# Patient Record
Sex: MALE | Race: WHITE | NOT HISPANIC OR LATINO | Employment: FULL TIME | URBAN - METROPOLITAN AREA
[De-identification: names, ages, dates, MRNs, and addresses within clinical notes are randomized per-mention and may not be internally consistent; named-entity substitution may affect disease eponyms.]

---

## 2018-02-02 ENCOUNTER — NON-PROVIDER VISIT (OUTPATIENT)
Dept: URGENT CARE | Facility: PHYSICIAN GROUP | Age: 23
End: 2018-02-02

## 2018-02-02 DIAGNOSIS — Z02.1 PRE-EMPLOYMENT DRUG SCREENING: ICD-10-CM

## 2018-02-02 LAB
AMP AMPHETAMINE: NEGATIVE
COC COCAINE: NEGATIVE
INT CON NEG: NEGATIVE
INT CON POS: POSITIVE
MET METHAMPHETAMINES: NEGATIVE
OPI OPIATES: NEGATIVE
PCP PHENCYCLIDINE: NEGATIVE
POC DRUG COMMENT 753798-OCCUPATIONAL HEALTH: NORMAL
THC: NEGATIVE

## 2018-02-02 PROCEDURE — 80305 DRUG TEST PRSMV DIR OPT OBS: CPT | Performed by: NURSE PRACTITIONER

## 2018-08-08 ENCOUNTER — NON-PROVIDER VISIT (OUTPATIENT)
Dept: OCCUPATIONAL MEDICINE | Facility: CLINIC | Age: 23
End: 2018-08-08

## 2018-08-08 DIAGNOSIS — Z02.1 PRE-EMPLOYMENT DRUG SCREENING: ICD-10-CM

## 2018-08-08 PROCEDURE — 80305 DRUG TEST PRSMV DIR OPT OBS: CPT | Performed by: INTERNAL MEDICINE

## 2018-08-14 LAB
AMP AMPHETAMINE: NORMAL
COC COCAINE: NORMAL
INT CON NEG: NORMAL
INT CON POS: NORMAL
MET METHAMPHETAMINES: NORMAL
OPI OPIATES: NORMAL
PCP PHENCYCLIDINE: NORMAL
POC DRUG COMMENT 753798-OCCUPATIONAL HEALTH: NEGATIVE
THC: NORMAL

## 2019-06-13 ENCOUNTER — OFFICE VISIT (OUTPATIENT)
Dept: URGENT CARE | Facility: PHYSICIAN GROUP | Age: 24
End: 2019-06-13

## 2019-06-13 DIAGNOSIS — Z02.1 PRE-EMPLOYMENT DRUG SCREENING: ICD-10-CM

## 2019-06-13 LAB
AMP AMPHETAMINE: NEGATIVE
COC COCAINE: NEGATIVE
INT CON NEG: NORMAL
INT CON POS: NORMAL
MET METHAMPHETAMINES: NEGATIVE
OPI OPIATES: NEGATIVE
PCP PHENCYCLIDINE: NEGATIVE
POC DRUG COMMENT 753798-OCCUPATIONAL HEALTH: NEGATIVE
THC: NEGATIVE

## 2022-05-08 ENCOUNTER — APPOINTMENT (OUTPATIENT)
Dept: RADIOLOGY | Facility: MEDICAL CENTER | Age: 27
End: 2022-05-08
Attending: EMERGENCY MEDICINE
Payer: OTHER MISCELLANEOUS

## 2022-05-08 ENCOUNTER — HOSPITAL ENCOUNTER (EMERGENCY)
Facility: MEDICAL CENTER | Age: 27
End: 2022-05-08
Attending: EMERGENCY MEDICINE
Payer: OTHER MISCELLANEOUS

## 2022-05-08 VITALS
WEIGHT: 155 LBS | TEMPERATURE: 98.6 F | OXYGEN SATURATION: 94 % | HEART RATE: 91 BPM | SYSTOLIC BLOOD PRESSURE: 133 MMHG | DIASTOLIC BLOOD PRESSURE: 61 MMHG | BODY MASS INDEX: 22.19 KG/M2 | RESPIRATION RATE: 17 BRPM | HEIGHT: 70 IN

## 2022-05-08 DIAGNOSIS — S93.05XA DISLOCATION OF LEFT ANKLE JOINT, INITIAL ENCOUNTER: ICD-10-CM

## 2022-05-08 DIAGNOSIS — S82.832A CLOSED FRACTURE OF DISTAL END OF LEFT FIBULA, UNSPECIFIED FRACTURE MORPHOLOGY, INITIAL ENCOUNTER: ICD-10-CM

## 2022-05-08 PROCEDURE — 307742 HCHG YELLOW TRAUMA TEAM SERVICES

## 2022-05-08 PROCEDURE — 302875 HCHG BANDAGE ACE 4 OR 6""

## 2022-05-08 PROCEDURE — 99285 EMERGENCY DEPT VISIT HI MDM: CPT

## 2022-05-08 PROCEDURE — 700101 HCHG RX REV CODE 250: Performed by: EMERGENCY MEDICINE

## 2022-05-08 PROCEDURE — 73590 X-RAY EXAM OF LOWER LEG: CPT | Mod: LT

## 2022-05-08 PROCEDURE — 96375 TX/PRO/DX INJ NEW DRUG ADDON: CPT

## 2022-05-08 PROCEDURE — 96374 THER/PROPH/DIAG INJ IV PUSH: CPT

## 2022-05-08 PROCEDURE — 700111 HCHG RX REV CODE 636 W/ 250 OVERRIDE (IP): Performed by: EMERGENCY MEDICINE

## 2022-05-08 PROCEDURE — 29515 APPLICATION SHORT LEG SPLINT: CPT

## 2022-05-08 PROCEDURE — 73600 X-RAY EXAM OF ANKLE: CPT | Mod: LT

## 2022-05-08 PROCEDURE — 27840 TREAT ANKLE DISLOCATION: CPT

## 2022-05-08 RX ORDER — KETAMINE HYDROCHLORIDE 50 MG/ML
INJECTION, SOLUTION INTRAMUSCULAR; INTRAVENOUS
Status: COMPLETED | OUTPATIENT
Start: 2022-05-08 | End: 2022-05-08

## 2022-05-08 RX ADMIN — PROPOFOL 40 MG: 10 INJECTION, EMULSION INTRAVENOUS at 00:32

## 2022-05-08 RX ADMIN — KETAMINE HYDROCHLORIDE 30 MG: 50 INJECTION INTRAMUSCULAR; INTRAVENOUS at 00:31

## 2022-05-08 NOTE — ED NOTES
LLE ankle dislocation and fibula fx on LLE xray  Dr Fox preparing to reduce.   Pt placed on EtCO2 monitor and 2L nasal cannula  RT Eddy at bedside.

## 2022-05-08 NOTE — PROGRESS NOTES
Trauma surgery    Responded the trauma yellow for reported fall from second story.  Patient is actually slip on steps with a an ankle fracture with no neurovascular compromise.  He is awake and alert.  Fractures being reduced by ERP  Plan is for splinting and discharge with orthopedic follow-up.

## 2022-05-08 NOTE — ED NOTES
Pt verbalized and demonstrated proper crutch use. Pt ambulated with crutches to restroom with steady gait and proper technique

## 2022-05-08 NOTE — ED NOTES
Pt arrived through the lobby, reportedly fell from 2nd floor railing, landing on feet, obvious deformity to LLE ankle

## 2022-05-08 NOTE — ED PROVIDER NOTES
"ED Provider Note      Means of Arrival: Private Vehicle  History obtained from: Patient    CHIEF COMPLAINT  No chief complaint on file.  Ankle pain    HPI  Meng Molina is a 27 y.o. male who presents as a trauma yellow from triage.  The patient was reportedly sliding down a banister from a second story level down a staircase to the first story level when after approximately 4 stairs he slipped off and injured his left ankle.  He denies any head strike or loss of consciousness.  He does report alcohol use tonight but denies any drug use.  He denies any chest pain, abdominal pain, back pain, paresthesias.    REVIEW OF SYSTEMS  CONSTITUTIONAL:  No fever.  CARDIOVASCULAR:  No chest discomfort.  RESPIRATORY:  No pleuritic chest pain.  GASTROINTESTINAL:  No abdominal pain.    See HPI for further details.   All other systems are negative.     PAST MEDICAL HISTORY  No past medical history on file.    FAMILY HISTORY  No family history on file.    SOCIAL HISTORY       SURGICAL HISTORY  No past surgical history on file.    CURRENT MEDICATIONS  Home Medications    **Home medications have not yet been reviewed for this encounter**         ALLERGIES  Not on File    PHYSICAL EXAM  VITAL SIGNS: /61   Pulse 91   Temp 37 °C (98.6 °F) (Temporal)   Resp 17   Ht 1.778 m (5' 10\")   Wt 70.3 kg (155 lb)   SpO2 94%   BMI 22.24 kg/m²    Gen: Alert, oriented  HENT: No racoon eyes, septal hematoma, facial instability.  Eye: EOMI, no chemosis, PERRL  Neck: trachea midline, no tenderness  Resp: no respiratory distress,  no chest wall tenderness or crepitus, clear to auscultation bilaterally  CV: No JVD, RRR.  + peripheral pulses, no murmurs, rubs, gallops  Abd: soft, non-distended, non-tender. No ecchymosis  Back: no spinal tenderness or deformities  Ext: Deformity to left ankle.  Distal CSM intact.  Otherwise, no deformities, tenderness or edema  Psych: normal mood  Neuro: speech fluent, moves all extremities. GCS " 15    RADIOLOGY/PROCEDURES  DX-ANKLE 2- VIEWS LEFT   Final Result      Near-anatomic alignment status post reduction.      DX-TIBIA AND FIBULA LEFT   Final Result      1.  Posterior tibiotalar dislocation with probable fracture of the posterior tibial malleolus.   2.  Mildly angulated obliquely oriented fracture of the distal fibular diaphysis.      US-ABORTED US PROCEDURE    (Results Pending)   Procedural sedation:  indication: Fracture dislocation left ankle  I have discussed with the patient and/or the patient representative the indication, alternatives, and the possible risks and/or complications of the planned procedure and the anesthesia methods. The patient and/or patient representative appear to understand and agree to proceed. Consent was obtained. An airway assessment was performed.     Mallampati: 2   ASA class: 1     A timeout was performed, including 2 patient identifiers, procedure, appropriate equipment and staff in the room, appropriate medications at bedside.       Patient vitals and continuous end-tidal CO2 were monitored during the sedation with RT at beside. Pt placed on NC and the airway box was present. PT received 30 mg of ketamine and 40 mg of propofol initial with good effect.       Procedure was performed without any complications. Patient tolerated sedation without any complications and was observed until the patient returned to normal alertness. Post sedation vital signes stable.     Post sedation exam: back to baseline  Total sedation time: 15 minutes  ======  Procedure fracture reduction  Indication: Ankle fracture/dislocation.  Informed consent was obtained verbally.  After adequate anesthesia with sedation, manual traction was placed with reduction of the joint.  This was splinted.  Distal CSM intact after splinting and reduction.  No immediate complications.    =============  Procedure: Physician applied splint  Indication: Fracture  After appropriate alignment of the fracture site  obtained, I applied a stirrup and posterior slab plaster splint to the left ankle. CSM intact post-splinting. There we no immediate complications.        COURSE & MEDICAL DECISION MAKING  Pertinent Labs & Imaging studies reviewed. (See chart for details)    Patient presents with a obvious deformity to the left ankle.  Remainder of trauma evaluation is reassuring.  Patient was activated as a trauma yellow based on mechanism of injury, however no evidence of other injuries.  Dr. Jacobo, trauma surgery agrees with my plan that I primarily manage the patient and referred to orthopedics.  The patient was successfully sedated, reduced in the trauma bay.  He will be ambulated on crutches and advised to follow-up with orthopedics.     Patient is able to ambulate easily on crutches. Not requiring further pain medications.    The patient was given return precautions, anticipatory guidance, and the opportunity to ask questions prior to discharge.     Appropriate PPE were worn at this encounter.     FINAL IMPRESSION  1. Dislocation of left ankle joint, initial encounter    2. Closed fracture of distal end of left fibula, unspecified fracture morphology, initial encounter           DISPOSITION:  Patient will be discharged home in stable condition.    FOLLOW UP:  Larry Verde M.D.  [address]  [id number] [phone]    Schedule an appointment as soon as possible for a visit       [facility], Emergency Dept  [address] [place] [id number] [phone]    If symptoms worsen      This dictation was created using voice recognition software. The accuracy of the dictation is limited to the abilities of the software. I expect there may be some errors of grammar and possibly content. The nursing notes were reviewed and certain aspects of this information were incorporated into this note.

## 2022-05-08 NOTE — ED NOTES
"Pt discharged home on crutches with friend. IV discontinued and gauze placed, pt in possession of belongings. Pt provided discharge education and information pertaining to medications and follow up appointments. Pt received copy of discharge instructions and verbalized understanding. /61   Pulse 91   Temp 37 °C (98.6 °F) (Temporal)   Resp 17   Ht 1.778 m (5' 10\")   Wt 70.3 kg (155 lb)   SpO2 94%   BMI 22.24 kg/m²   "

## 2022-05-08 NOTE — DISCHARGE INSTRUCTIONS
You were seen in the emergency department after suffering a fall.  You had a fracture dislocation of your left ankle.  This was put back into place and splinted in the emergency department.  It is very important to follow-up with the orthopedic surgeon to determine whether you need surgery to recover from this.    Please keep your foot elevated is much as possible to reduce swelling and pain.        For pain you can take acetaminophen (Tylenol), 1000mg every 8 hours as needed for pain. Do not take more than 3000mg of acetaminophen in any 24 hour period. You can also take  ibuprofen (Motrin), 600mg every 6 hours as needed for pain (take with food to avoid GI upset).  Taking these medications regularly during the day can be very effective in controlling pain.    Please return to the emergency department or seek medical attention if you develop:  Uncontrollable pain, cold dusky toes, any other new or concerning findings

## 2022-05-13 ENCOUNTER — APPOINTMENT (OUTPATIENT)
Dept: RADIOLOGY | Facility: MEDICAL CENTER | Age: 27
End: 2022-05-13
Attending: ORTHOPAEDIC SURGERY
Payer: OTHER MISCELLANEOUS

## 2022-05-13 PROBLEM — S82.62XA CLOSED FRACTURE OF DISTAL LATERAL MALLEOLUS OF LEFT ANKLE: Status: ACTIVE | Noted: 2022-05-13

## 2022-05-14 ENCOUNTER — ANESTHESIA EVENT (OUTPATIENT)
Dept: SURGERY | Facility: MEDICAL CENTER | Age: 27
End: 2022-05-14

## 2022-05-16 ENCOUNTER — HOSPITAL ENCOUNTER (OUTPATIENT)
Facility: MEDICAL CENTER | Age: 27
End: 2022-05-16
Attending: ORTHOPAEDIC SURGERY | Admitting: ORTHOPAEDIC SURGERY
Payer: OTHER MISCELLANEOUS

## 2022-05-16 ENCOUNTER — ANESTHESIA (OUTPATIENT)
Dept: SURGERY | Facility: MEDICAL CENTER | Age: 27
End: 2022-05-16
Payer: OTHER MISCELLANEOUS

## 2022-05-16 ENCOUNTER — HOSPITAL ENCOUNTER (OUTPATIENT)
Dept: RADIOLOGY | Facility: MEDICAL CENTER | Age: 27
End: 2022-05-16
Attending: ORTHOPAEDIC SURGERY

## 2022-05-16 VITALS
HEIGHT: 70 IN | RESPIRATION RATE: 16 BRPM | SYSTOLIC BLOOD PRESSURE: 102 MMHG | DIASTOLIC BLOOD PRESSURE: 61 MMHG | BODY MASS INDEX: 21.84 KG/M2 | OXYGEN SATURATION: 97 % | TEMPERATURE: 98.5 F | HEART RATE: 81 BPM | WEIGHT: 152.56 LBS

## 2022-05-16 PROCEDURE — 700101 HCHG RX REV CODE 250: Performed by: ORTHOPAEDIC SURGERY

## 2022-05-16 PROCEDURE — 160002 HCHG RECOVERY MINUTES (STAT): Performed by: ORTHOPAEDIC SURGERY

## 2022-05-16 PROCEDURE — 700105 HCHG RX REV CODE 258: Performed by: ORTHOPAEDIC SURGERY

## 2022-05-16 PROCEDURE — 160048 HCHG OR STATISTICAL LEVEL 1-5: Performed by: ORTHOPAEDIC SURGERY

## 2022-05-16 PROCEDURE — 27792 TREATMENT OF ANKLE FRACTURE: CPT | Mod: LT | Performed by: ORTHOPAEDIC SURGERY

## 2022-05-16 PROCEDURE — 27792 TREATMENT OF ANKLE FRACTURE: CPT | Mod: ASROC,LT | Performed by: NURSE PRACTITIONER

## 2022-05-16 PROCEDURE — 160009 HCHG ANES TIME/MIN: Performed by: ORTHOPAEDIC SURGERY

## 2022-05-16 PROCEDURE — 160029 HCHG SURGERY MINUTES - 1ST 30 MINS LEVEL 4: Performed by: ORTHOPAEDIC SURGERY

## 2022-05-16 PROCEDURE — 64445 NJX AA&/STRD SCIATIC NRV IMG: CPT | Mod: 59 | Performed by: ANESTHESIOLOGY

## 2022-05-16 PROCEDURE — A6223 GAUZE >16<=48 NO W/SAL W/O B: HCPCS | Performed by: ORTHOPAEDIC SURGERY

## 2022-05-16 PROCEDURE — 27829 TREAT LOWER LEG JOINT: CPT | Mod: LT | Performed by: ORTHOPAEDIC SURGERY

## 2022-05-16 PROCEDURE — 160035 HCHG PACU - 1ST 60 MINS PHASE I: Performed by: ORTHOPAEDIC SURGERY

## 2022-05-16 PROCEDURE — 27829 TREAT LOWER LEG JOINT: CPT | Mod: ASROC,LT | Performed by: NURSE PRACTITIONER

## 2022-05-16 PROCEDURE — 700101 HCHG RX REV CODE 250: Performed by: ANESTHESIOLOGY

## 2022-05-16 PROCEDURE — 700111 HCHG RX REV CODE 636 W/ 250 OVERRIDE (IP): Performed by: ANESTHESIOLOGY

## 2022-05-16 PROCEDURE — 01480 ANES OPEN PX LOWER L/A/F NOS: CPT | Performed by: ANESTHESIOLOGY

## 2022-05-16 PROCEDURE — 700105 HCHG RX REV CODE 258: Performed by: ANESTHESIOLOGY

## 2022-05-16 PROCEDURE — C1713 ANCHOR/SCREW BN/BN,TIS/BN: HCPCS | Performed by: ORTHOPAEDIC SURGERY

## 2022-05-16 PROCEDURE — 73600 X-RAY EXAM OF ANKLE: CPT | Mod: LT

## 2022-05-16 PROCEDURE — 64445 NJX AA&/STRD SCIATIC NRV IMG: CPT | Performed by: ORTHOPAEDIC SURGERY

## 2022-05-16 PROCEDURE — 76942 ECHO GUIDE FOR BIOPSY: CPT | Performed by: ANESTHESIOLOGY

## 2022-05-16 PROCEDURE — 160041 HCHG SURGERY MINUTES - EA ADDL 1 MIN LEVEL 4: Performed by: ORTHOPAEDIC SURGERY

## 2022-05-16 PROCEDURE — 502000 HCHG MISC OR IMPLANTS RC 0278: Performed by: ORTHOPAEDIC SURGERY

## 2022-05-16 PROCEDURE — 501838 HCHG SUTURE GENERAL: Performed by: ORTHOPAEDIC SURGERY

## 2022-05-16 DEVICE — SCREW BONE VARIAX T10 FULL THREAD L18 MM OD3.5 MM FOOT ANKLE STARDRIVE NONSTERILE: Type: IMPLANTABLE DEVICE | Site: ANKLE | Status: FUNCTIONAL

## 2022-05-16 DEVICE — SCREW BONE VARIAX T10 FULL THREAD L12 MM OD3.5 MM FOOT ANKLE LOCK STARDRIVE NONSTERILE: Type: IMPLANTABLE DEVICE | Site: ANKLE | Status: FUNCTIONAL

## 2022-05-16 DEVICE — IMPLANTABLE DEVICE: Type: IMPLANTABLE DEVICE | Site: ANKLE | Status: FUNCTIONAL

## 2022-05-16 DEVICE — SCREW BONE VARIAX T10 FULL THREAD L12 MM OD3.5 MM FOOT ANKLE STARDRIVE NONSTERILE: Type: IMPLANTABLE DEVICE | Site: ANKLE | Status: FUNCTIONAL

## 2022-05-16 DEVICE — SCREW BONE VARIAX T10 FULL THREAD L14 MM OD3.5 MM FOOT ANKLE LOCK STARDRIVE NONSTERILE: Type: IMPLANTABLE DEVICE | Site: ANKLE | Status: FUNCTIONAL

## 2022-05-16 DEVICE — SCREW BONE VARIAX T10 FULL THREAD L16 MM OD3.5 MM FOOT ANKLE STARDRIVE NONSTERILE: Type: IMPLANTABLE DEVICE | Site: ANKLE | Status: FUNCTIONAL

## 2022-05-16 RX ORDER — SODIUM CHLORIDE, SODIUM LACTATE, POTASSIUM CHLORIDE, CALCIUM CHLORIDE 600; 310; 30; 20 MG/100ML; MG/100ML; MG/100ML; MG/100ML
INJECTION, SOLUTION INTRAVENOUS CONTINUOUS
Status: DISCONTINUED | OUTPATIENT
Start: 2022-05-16 | End: 2022-05-16 | Stop reason: HOSPADM

## 2022-05-16 RX ORDER — METOCLOPRAMIDE HYDROCHLORIDE 5 MG/ML
INJECTION INTRAMUSCULAR; INTRAVENOUS PRN
Status: DISCONTINUED | OUTPATIENT
Start: 2022-05-16 | End: 2022-05-16 | Stop reason: SURG

## 2022-05-16 RX ORDER — BUPIVACAINE HYDROCHLORIDE 5 MG/ML
INJECTION, SOLUTION EPIDURAL; INTRACAUDAL
Status: DISCONTINUED | OUTPATIENT
Start: 2022-05-16 | End: 2022-05-16 | Stop reason: HOSPADM

## 2022-05-16 RX ORDER — OXYCODONE HCL 5 MG/5 ML
10 SOLUTION, ORAL ORAL
Status: DISCONTINUED | OUTPATIENT
Start: 2022-05-16 | End: 2022-05-16 | Stop reason: HOSPADM

## 2022-05-16 RX ORDER — ONDANSETRON 2 MG/ML
4 INJECTION INTRAMUSCULAR; INTRAVENOUS
Status: DISCONTINUED | OUTPATIENT
Start: 2022-05-16 | End: 2022-05-16 | Stop reason: HOSPADM

## 2022-05-16 RX ORDER — HYDROMORPHONE HYDROCHLORIDE 1 MG/ML
0.4 INJECTION, SOLUTION INTRAMUSCULAR; INTRAVENOUS; SUBCUTANEOUS
Status: DISCONTINUED | OUTPATIENT
Start: 2022-05-16 | End: 2022-05-16 | Stop reason: HOSPADM

## 2022-05-16 RX ORDER — ONDANSETRON 2 MG/ML
INJECTION INTRAMUSCULAR; INTRAVENOUS PRN
Status: DISCONTINUED | OUTPATIENT
Start: 2022-05-16 | End: 2022-05-16 | Stop reason: SURG

## 2022-05-16 RX ORDER — HYDROMORPHONE HYDROCHLORIDE 1 MG/ML
0.2 INJECTION, SOLUTION INTRAMUSCULAR; INTRAVENOUS; SUBCUTANEOUS
Status: DISCONTINUED | OUTPATIENT
Start: 2022-05-16 | End: 2022-05-16 | Stop reason: HOSPADM

## 2022-05-16 RX ORDER — LIDOCAINE HYDROCHLORIDE 20 MG/ML
INJECTION, SOLUTION EPIDURAL; INFILTRATION; INTRACAUDAL; PERINEURAL PRN
Status: DISCONTINUED | OUTPATIENT
Start: 2022-05-16 | End: 2022-05-16 | Stop reason: SURG

## 2022-05-16 RX ORDER — KETOROLAC TROMETHAMINE 30 MG/ML
INJECTION, SOLUTION INTRAMUSCULAR; INTRAVENOUS PRN
Status: DISCONTINUED | OUTPATIENT
Start: 2022-05-16 | End: 2022-05-16 | Stop reason: SURG

## 2022-05-16 RX ORDER — IBUPROFEN 200 MG
400 TABLET ORAL EVERY 6 HOURS PRN
COMMUNITY

## 2022-05-16 RX ORDER — OXYCODONE HCL 5 MG/5 ML
5 SOLUTION, ORAL ORAL
Status: DISCONTINUED | OUTPATIENT
Start: 2022-05-16 | End: 2022-05-16 | Stop reason: HOSPADM

## 2022-05-16 RX ORDER — CEFAZOLIN SODIUM 1 G/3ML
2 INJECTION, POWDER, FOR SOLUTION INTRAMUSCULAR; INTRAVENOUS ONCE
Status: DISCONTINUED | OUTPATIENT
Start: 2022-05-16 | End: 2022-05-16 | Stop reason: HOSPADM

## 2022-05-16 RX ORDER — HALOPERIDOL 5 MG/ML
1 INJECTION INTRAMUSCULAR
Status: DISCONTINUED | OUTPATIENT
Start: 2022-05-16 | End: 2022-05-16 | Stop reason: HOSPADM

## 2022-05-16 RX ORDER — MEPERIDINE HYDROCHLORIDE 25 MG/ML
12.5 INJECTION INTRAMUSCULAR; INTRAVENOUS; SUBCUTANEOUS
Status: DISCONTINUED | OUTPATIENT
Start: 2022-05-16 | End: 2022-05-16 | Stop reason: HOSPADM

## 2022-05-16 RX ORDER — SODIUM CHLORIDE, SODIUM LACTATE, POTASSIUM CHLORIDE, CALCIUM CHLORIDE 600; 310; 30; 20 MG/100ML; MG/100ML; MG/100ML; MG/100ML
INJECTION, SOLUTION INTRAVENOUS CONTINUOUS
Status: ACTIVE | OUTPATIENT
Start: 2022-05-16 | End: 2022-05-16

## 2022-05-16 RX ORDER — SODIUM CHLORIDE, SODIUM LACTATE, POTASSIUM CHLORIDE, CALCIUM CHLORIDE 600; 310; 30; 20 MG/100ML; MG/100ML; MG/100ML; MG/100ML
INJECTION, SOLUTION INTRAVENOUS
Status: DISCONTINUED | OUTPATIENT
Start: 2022-05-16 | End: 2022-05-16 | Stop reason: SURG

## 2022-05-16 RX ORDER — ALBUTEROL SULFATE 2.5 MG/3ML
2.5 SOLUTION RESPIRATORY (INHALATION)
Status: DISCONTINUED | OUTPATIENT
Start: 2022-05-16 | End: 2022-05-16 | Stop reason: HOSPADM

## 2022-05-16 RX ORDER — DEXAMETHASONE SODIUM PHOSPHATE 4 MG/ML
INJECTION, SOLUTION INTRA-ARTICULAR; INTRALESIONAL; INTRAMUSCULAR; INTRAVENOUS; SOFT TISSUE PRN
Status: DISCONTINUED | OUTPATIENT
Start: 2022-05-16 | End: 2022-05-16 | Stop reason: SURG

## 2022-05-16 RX ORDER — CEFAZOLIN SODIUM 1 G/3ML
INJECTION, POWDER, FOR SOLUTION INTRAMUSCULAR; INTRAVENOUS PRN
Status: DISCONTINUED | OUTPATIENT
Start: 2022-05-16 | End: 2022-05-16 | Stop reason: SURG

## 2022-05-16 RX ORDER — HYDROMORPHONE HYDROCHLORIDE 1 MG/ML
0.1 INJECTION, SOLUTION INTRAMUSCULAR; INTRAVENOUS; SUBCUTANEOUS
Status: DISCONTINUED | OUTPATIENT
Start: 2022-05-16 | End: 2022-05-16 | Stop reason: HOSPADM

## 2022-05-16 RX ORDER — ROPIVACAINE HYDROCHLORIDE 5 MG/ML
INJECTION, SOLUTION EPIDURAL; INFILTRATION; PERINEURAL PRN
Status: DISCONTINUED | OUTPATIENT
Start: 2022-05-16 | End: 2022-05-16 | Stop reason: SURG

## 2022-05-16 RX ADMIN — LIDOCAINE HYDROCHLORIDE 100 MG: 20 INJECTION, SOLUTION EPIDURAL; INFILTRATION; INTRACAUDAL at 17:31

## 2022-05-16 RX ADMIN — DEXAMETHASONE SODIUM PHOSPHATE 4 MG: 4 INJECTION, SOLUTION INTRA-ARTICULAR; INTRALESIONAL; INTRAMUSCULAR; INTRAVENOUS; SOFT TISSUE at 17:29

## 2022-05-16 RX ADMIN — ONDANSETRON 4 MG: 2 INJECTION INTRAMUSCULAR; INTRAVENOUS at 18:16

## 2022-05-16 RX ADMIN — FENTANYL CITRATE 100 MCG: 50 INJECTION, SOLUTION INTRAMUSCULAR; INTRAVENOUS at 17:24

## 2022-05-16 RX ADMIN — MIDAZOLAM 2 MG: 1 INJECTION INTRAMUSCULAR; INTRAVENOUS at 17:24

## 2022-05-16 RX ADMIN — PROPOFOL 200 MG: 10 INJECTION, EMULSION INTRAVENOUS at 17:31

## 2022-05-16 RX ADMIN — SODIUM CHLORIDE, POTASSIUM CHLORIDE, SODIUM LACTATE AND CALCIUM CHLORIDE: 600; 310; 30; 20 INJECTION, SOLUTION INTRAVENOUS at 14:57

## 2022-05-16 RX ADMIN — KETOROLAC TROMETHAMINE 30 MG: 30 INJECTION, SOLUTION INTRAMUSCULAR at 18:16

## 2022-05-16 RX ADMIN — CEFAZOLIN 2 G: 330 INJECTION, POWDER, FOR SOLUTION INTRAMUSCULAR; INTRAVENOUS at 17:31

## 2022-05-16 RX ADMIN — FENTANYL CITRATE 50 MCG: 50 INJECTION, SOLUTION INTRAMUSCULAR; INTRAVENOUS at 18:28

## 2022-05-16 RX ADMIN — LIDOCAINE HYDROCHLORIDE 0.5 ML: 10 INJECTION, SOLUTION EPIDURAL; INFILTRATION; INTRACAUDAL; PERINEURAL at 14:57

## 2022-05-16 RX ADMIN — METOCLOPRAMIDE 10 MG: 5 INJECTION, SOLUTION INTRAMUSCULAR; INTRAVENOUS at 18:16

## 2022-05-16 RX ADMIN — FENTANYL CITRATE 50 MCG: 50 INJECTION, SOLUTION INTRAMUSCULAR; INTRAVENOUS at 18:05

## 2022-05-16 RX ADMIN — ROPIVACAINE HYDROCHLORIDE 20 ML: 5 INJECTION, SOLUTION EPIDURAL; INFILTRATION; PERINEURAL at 17:29

## 2022-05-16 RX ADMIN — DEXAMETHASONE SODIUM PHOSPHATE 4 MG: 4 INJECTION, SOLUTION INTRA-ARTICULAR; INTRALESIONAL; INTRAMUSCULAR; INTRAVENOUS; SOFT TISSUE at 17:31

## 2022-05-16 RX ADMIN — SODIUM CHLORIDE, POTASSIUM CHLORIDE, SODIUM LACTATE AND CALCIUM CHLORIDE: 600; 310; 30; 20 INJECTION, SOLUTION INTRAVENOUS at 17:24

## 2022-05-16 ASSESSMENT — PAIN DESCRIPTION - PAIN TYPE
TYPE: SURGICAL PAIN

## 2022-05-16 NOTE — LETTER
May 13, 2022    Patient Name: Meng Molina  Surgeon Name: Yakov Andrade M.D.  Surgery Facility: [facility] ([address] [facility])  Surgery Date: 5/16/2022    The time of your surgery is not final and may change up to and until the day of your surgery. You will be contacted 24-48 hours prior to your surgery date with your check-in and surgery time.    If you will not be at one of the below numbers please call/text the surgery scheduler at [phone]  Preferred Phone: [phone]    BEFORE YOUR SURGERY   Pre Registration and/or Lab Work must be done within and no earlier than 28 days prior to your surgery date. Please call [facility] at [phone] for an appointment as soon as possible.    COVID testing is not required for non-symptomatic vaccinated patients with proof of vaccination at [facility].  For un-vaccinated patients please refer to the following instructions for your COVID testing requirements:    COVID test required 4-7 days prior to surgery, failure to do so can result in a cancellation.    The following locations offer COVID testing:    Approved facilities for COVID testing, if scheduled at [facility]:  · [facility] from 7:30am-3:30pm at [address] [place], [place]  · [facility] [phone] (Please call for an appointment)  · Your local pharmacy    Not scheduled at [facility] contact the scheduled facility for approved testing facilities.    Pre op Appointment:  Instructions: Bring a list of all medications you are taking including the dosing and frequency.    Please refrain from smoking any substance after midnight prior to surgery. Smoking may interfere with the anesthetic and frequently produces nausea during the recovery period.    Continue taking all lifesaving medications. Including the morning of your surgery with small sip of water.    Please read the MEDICATION INSTRUCTIONS below completely.    DAY OF YOUR  SURGERY  Nothing to eat or drink after midnight     Please arrive at the hospital/surgery center at the check-in time provided.     An adult will need to bring you and take you home after your surgery.     AFTER YOUR SURGERY  Post op Appointment:   Date: 05/31/22   Time: 08:45AM   With: Yakov Andrade M.D.   Location: [address] [place] [address]    TIME OFF WORK  FMLA or Disability forms can be faxed directly to: [phone] or you may drop them off at [facility] [place] [address]. Our office charges a $35.00 fee per form. Forms will be completed within 10 business days of drop off and payment received. For the status of your forms you may contact our disability office directly [phone].    MEDICATION INSTRUCTIONS  The following medications should be stopped a minimum of 10 days prior to surgery:  All over the counter, Supplements & Herbal medications    Anorectics: Phentermine (Adipex-P, Lomaira and Suprenza), Phentermine-topiramate (Qsymia), Bupropion-naltrexone (Contrave)    Opiod Partial Agonists/Opioid Antagonists: Buprenorphine (Subocone, Belbuca, Butrans, Probuphine Implant, Sublocade), Naltrexone (ReVia, Vivitrol), Naloxone    Amphetamines: Dextroamphetamine/Amphetamine (Adderall, Mydayis), Methylphenidate Hydrochloride (Concerta, Metadate, Methylin, Ritalin)    The following medications should be stopped 5 days prior to surgery:  Blood Thinners: Any Aspirin, Aspirin products, anti-inflammatories such as ibuprofen and any blood thinners such as Coumadin and Plavix. Please consult your prescribing physician if you are on life saving blood thinners, in regards to when to stop medications prior to surgery.     The following medications should be stopped a minimum of 3 days prior to surgery:  PDE-5 inhibitors: Sildenafil (Viagra), Tadalafil (Cialis), Vardenafil (Levitra), Avanafil (Stendra)    MAO Inhibitors: Rasagiline (Azilect), Selegiline (Eldepryl, Emsam, Selapar),  Isocarboxazid (Marplan), Phenelzine (Nardil)

## 2022-05-16 NOTE — PROGRESS NOTES
The patient has a fracture dislocation of the left ankle that is now status post closed reduction.  He is indicated for urgent operative fixation of his left ankle fracture dislocation.  To expedite healing as well as improved functional outcome I would recommend fixation of his fracture in an urgent fashion today.

## 2022-05-17 NOTE — ANESTHESIA PROCEDURE NOTES
Airway    Date/Time: 5/16/2022 5:32 PM  Performed by: Osbaldo Ramos M.D.  Authorized by: Osbaldo Ramos M.D.     Location:  OR  Urgency:  Elective  Indications for Airway Management:  Anesthesia      Spontaneous Ventilation: absent    Sedation Level:  Deep  Preoxygenated: Yes    Final Airway Type:  Supraglottic airway  Final Supraglottic Airway:  Standard LMA    SGA Size:  4  Number of Attempts at Approach:  1

## 2022-05-17 NOTE — ANESTHESIA PREPROCEDURE EVALUATION
Case: [id number] Anesthesia Start Date/Time: 05/16/22 1724    Procedures:       LEFT LATERAL MALLEOLUS OPEN REDUCTION INTERNAL FIXATION, SYNDESMOSIS OPEN REDUCTION INTERNAL FIXATION, POSSIBLE DELTOID LIGAMENT REPAIR (Left )      REPAIR, LIGAMENT    Diagnosis: Closed fracture of distal lateral malleolus of left ankle [S82.62XA]    Pre-op diagnosis: Closed fracture of distal lateral malleolus of left ankle [S82.62XA]    Location: [place] [address] / SURGERY [facility]    Surgeons: Yakov Andrade M.D.          Relevant Problems   Other   (positive) Closed fracture of distal lateral malleolus of left ankle       Physical Exam    Airway   Mallampati: II  TM distance: >3 FB  Neck ROM: full       Cardiovascular - normal exam  Rhythm: regular  Rate: normal  (-) murmur     Dental - normal exam           Pulmonary - normal exam  Breath sounds clear to auscultation     Abdominal    Neurological - normal exam                 Anesthesia Plan    ASA 1       Plan - general and peripheral nerve block     Peripheral nerve block will be post-op pain control  Airway plan will be LMA          Induction: intravenous    Postoperative Plan: Postoperative administration of opioids is intended.    Pertinent diagnostic labs and testing reviewed    Informed Consent:    Anesthetic plan and risks discussed with patient.

## 2022-05-17 NOTE — OR SURGEON
Immediate Post OP Note    PreOp Diagnosis: left lateral malleolus fracture, syndesmosis disruption      PostOp Diagnosis: same      Procedure(s):  LEFT LATERAL MALLEOLUS OPEN REDUCTION INTERNAL FIXATION, SYNDESMOSIS OPEN REDUCTION INTERNAL FIXATION, - Wound Class: Clean    Surgeon(s):  Yakov Andrade M.D.    Anesthesiologist/Type of Anesthesia:  Anesthesiologist: Osbaldo Ramos M.D./General    Surgical Staff:  Assistant: OLINDA Worley  Circulator: Tereza Lu R.N.  Scrub Person: Moriah Feliz  X-Ray Technologist: Harjeet Segovia    Specimens removed if any:  * No specimens in log *    Estimated Blood Loss:  10cc    Findings: stable fixation after reduction    Complications: none        5/16/2022 6:31 PM Yakov Andrade M.D.

## 2022-05-17 NOTE — ANESTHESIA POSTPROCEDURE EVALUATION
Patient: Meng Molina    Procedure Summary     Date: 05/16/22 Room / Location: [place] [address] / SURGERY [facility]    Anesthesia Start: 1724 Anesthesia Stop: 1840    Procedure: LEFT LATERAL MALLEOLUS OPEN REDUCTION INTERNAL FIXATION, SYNDESMOSIS OPEN REDUCTION INTERNAL FIXATION, (Left Ankle) Diagnosis:       Closed fracture of distal lateral malleolus of left ankle      (Closed fracture of distal lateral malleolus of left ankle [[id number]])    Surgeons: Yakov Andrade M.D. Responsible Provider: Osbaldo Ramos M.D.    Anesthesia Type: general, peripheral nerve block ASA Status: 1          Final Anesthesia Type: general, peripheral nerve block  Last vitals  BP   Blood Pressure: 102/61    Temp   36.9 °C (98.5 °F)    Pulse   81   Resp   16    SpO2   97 %      Anesthesia Post Evaluation    Patient location during evaluation: PACU  Patient participation: complete - patient participated  Level of consciousness: awake and alert    Airway patency: patent  Anesthetic complications: no  Cardiovascular status: hemodynamically stable  Respiratory status: acceptable  Hydration status: euvolemic    PONV: none          No complications documented.     Nurse Pain Score: 0 (NPRS)

## 2022-05-17 NOTE — OP REPORT
DATE OF SERVICE:  05/16/2022     SERVICE:  Orthopedic Surgery.     SURGEON:  Yakov Andrade MD     ASSISTANT:  PAOLA Boles     PREOPERATIVE DIAGNOSES:  1.  Left ankle fracture dislocation.  2.  Left lateral malleolus fracture.  3.  Left syndesmosis disruption.     POSTOPERATIVE DIAGNOSES:  1.  Left ankle fracture dislocation.  2.  Left lateral malleolus fracture.  3.  Left syndesmosis disruption.     PROCEDURES PERFORMED:  1.  Open reduction and internal fixation, left ankle lateral malleolus.  2.  Open reduction and internal fixation, left ankle syndesmosis.     COMPLICATIONS:  None.     SPECIMENS:  None.     TOURNIQUET TIME:  36 minutes.     IMPLANTS:  1.  [place] small fragment plates and screws.  2.  [facility] SynchFix syndesmotic fixation.     INDICATIONS FOR PROCEDURE:  The patient is a pleasant 27-year-old male who   sustained the above stated diagnoses approximately one week prior to the date   of surgery.  He was reduced and splinted in the emergency department and his   reduction was held in a good position in a splint.  Operative and nonoperative   treatment were discussed with the patient.  He elected for operative   intervention.  Risks of procedure were discussed with the patient, which   include but not limited to bleeding, infection, damage to surrounding nerves,   tendons, ligaments, other structures, risk of nonunion or malunion, need for   future revision surgery, continued pain, unsightly scar, posttraumatic   arthritis, DVT, stroke, myocardial infarction and even death.  Benefits   include improved overall functional outcome and improved pain control.  The   patient wished to proceed and surgical consent forms were signed.     DESCRIPTION OF PROCEDURE:  On the day of surgery, the patient was met in the   preoperative area.  The operative extremity was identified by myself and   confirmed with the patient and marked with my initials.  He was then brought   back to the  operating room and placed supine on the operating room table.    Regional nerve block was performed for postoperative pain control.  All bony   prominences were padded.  Laryngeal mask airway anesthesia was undertaken   without incident.  Nonsterile thigh tourniquet was placed on the left thigh.    SCD was placed on the contralateral lower extremity.  Left lower extremity was   then prepped and draped in the usual sterile fashion.  The patient received 2   grams Ancef within 30 minutes of incision.  Multidisciplinary timeout was   performed confirming the correct site, correct patient, and correct procedure.    Once all were in agreement, we then elevated the left lower extremity for   approximately 2 minutes time and tourniquet was brought to 250 mmHg.  We began   with a lateral incision centered over the lateral malleolus.  This was   carefully dissected down.  Fracture site was identified, cleared of debris.    Anatomic reduction was obtained with a good read anteriorly as well as   laterally.  This was then held in place with a 3.5 mm lag screw by technique.    Clamps were maintained on the fracture site while a lateral one-third tubular   plate was placed on the fibula.  This was contoured to the patient's anatomy.    Nonlocking screws were then placed proximally and distally.  This was then   followed by a combination of nonlocking and locking screws proximally and   distally.  Given the dislocation as well as the disruption of the syndesmosis,   additional dissection was carried out over the anterior aspect of the fibula   into the distal tibia incisura.  We were then able to anatomically reduce the   fibula within the distal tibia incisura.  A guide pin for a SynchFix   syndesmotic fixation was then placed from the fibula to the tibia through the   distal hole of the plate.  Medial incision was then made.  Suture fixation was   then passed from the fibula to the tibia.  Medial button was placed on the    medial cortex of the tibia, lateral button was then placed through the plate   serving as a washer.  This was then tightened down and confirmed in good   overall position on x-ray.  Final fluoroscopic imaging demonstrated no talar   tilt; therefore, the deltoid ligament was not repaired.  At this point, the   wounds were then copiously irrigated.  Deep layer was closed.  Tourniquet was   let down.  Hemostasis was achieved.  The superficial layer was then closed.    Sterile dressings were applied.  Short leg posterior splint was applied in   neutral.  The patient was awoken from anesthesia, moved on to the   postoperative gurney and to PACU in stable condition.     POSTOPERATIVE PLAN:  The patient will be nonweightbearing on the left lower   extremity for the next 2 weeks.  He will then commence touchdown weightbearing   at 2 weeks postoperative and sutures will be removed if indicated to do so.    He will be seen in clinic in 2 weeks' time, at which point, weightbearing   x-rays of the left ankle will be taken.        ______________________________  MD MADALYN Harmon/JONATHAN    DD:  05/16/2022 18:36  DT:  05/16/2022 20:23    Job#:  [id number]

## 2022-05-17 NOTE — DISCHARGE INSTRUCTIONS
ACTIVITY: Rest and take it easy for the first 24 hours.  A responsible adult is recommended to remain with you during that time.  It is normal to feel sleepy.  We encourage you to not do anything that requires balance, judgment or coordination.     MILD FLU-LIKE SYMPTOMS ARE NORMAL. YOU MAY EXPERIENCE GENERALIZED MUSCLE ACHES, THROAT IRRITATION, HEADACHE AND/OR SOME NAUSEA.    FOR 24 HOURS DO NOT:  Drive, operate machinery or run household appliances.  Drink beer or alcoholic beverages.   Make important decisions or sign legal documents.      DIET: To avoid nausea, slowly advance diet as tolerated, avoiding spicy or greasy foods for the first day.  Add more substantial food to your diet according to your physician's instructions.  Babies can be fed formula or breast milk as soon as they are hungry.  INCREASE FLUIDS AND FIBER TO AVOID CONSTIPATION.    SURGICAL DRESSING/BATHING: Keep clean and dry, leave dressing in place until follow up appointment in clinic.    FOLLOW-UP APPOINTMENT:  A follow-up appointment should be arranged with your doctor; call to schedule.    You should CALL YOUR PHYSICIAN if you develop:  Fever greater than 101 degrees F.  Pain not relieved by medication, or persistent nausea or vomiting.  Excessive bleeding (blood soaking through dressing) or unexpected drainage from the wound.  Extreme redness or swelling around the incision site, drainage of pus or foul smelling drainage.  Inability to urinate or empty your bladder within 8 hours.  Problems with breathing or chest pain.    You should call 911 if you develop problems with breathing or chest pain.  If you are unable to contact your doctor or surgical center, you should go to the nearest emergency room or urgent care center.  Physician's telephone #: [phone]    If any questions arise, call your doctor.  If your doctor is not available, please feel free to call the Surgical Center at [phone].     A registered nurse may call you  a few days after your surgery to see how you are doing after your procedure.    MEDICATIONS: Resume taking daily medication.  Take prescribed pain medication with food.  If no medication is prescribed, you may take non-aspirin pain medication if needed.  PAIN MEDICATION CAN BE VERY CONSTIPATING.  Take a stool softener or laxative such as senokot, pericolace, or milk of magnesia if needed.    Prescription given for oxycodone, tylenol, motrin, gabapentin, and hydroxyzine.  Last pain medication given at n/a.    If your physician has prescribed pain medication that includes Acetaminophen (Tylenol), do not take additional Acetaminophen (Tylenol) while taking the prescribed medication.    Depression / Suicide Risk    As you are discharged from this [facility] facility, it is important to learn how to keep safe from harming yourself.    Recognize the warning signs:  Abrupt changes in personality, positive or negative- including increase in energy   Giving away possessions  Change in eating patterns- significant weight changes-  positive or negative  Change in sleeping patterns- unable to sleep or sleeping all the time   Unwillingness or inability to communicate  Depression  Unusual sadness, discouragement and loneliness  Talk of wanting to die  Neglect of personal appearance   Rebelliousness- reckless behavior  Withdrawal from people/activities they love  Confusion- inability to concentrate     If you or a loved one observes any of these behaviors or has concerns about self-harm, here's what you can do:  Talk about it- your feelings and reasons for harming yourself  Remove any means that you might use to hurt yourself (examples: pills, rope, extension cords, firearm)  Get professional help from the community (Mental Health, Substance Abuse, psychological counseling)  Do not be alone:Call your Safe Contact- someone whom you trust who will be there for you.  Call your local CRISIS HOTLINE 404-0513 or [phone]  Call  your local Children's Mobile Crisis Response Team [place] (894) 799-3202 or www.[employer]  Call the toll free National Suicide Prevention Hotlines   National Suicide Prevention Lifeline 490-528-RVRB (9083)  National Hope Line Network 800-SUICIDE (372-3389)

## 2022-05-17 NOTE — ANESTHESIA PROCEDURE NOTES
Peripheral Block    Date/Time: 5/16/2022 5:28 PM  Performed by: Osbaldo Ramos M.D.  Authorized by: Osbaldo Ramos M.D.     Patient Location:  OR  Start Time:  5/16/2022 5:28 PM  End Time:  5/16/2022 5:29 PM  Reason for Block: at surgeon's request and post-op pain management ONLY    patient identified, IV checked, site marked, risks and benefits discussed, surgical consent, monitors and equipment checked, pre-op evaluation and timeout performed    Patient Position:  Supine  Prep: ChloraPrep    Monitoring:  Heart rate, continuous pulse ox and cardiac monitor  Block Region:  Lower Extremity  Lower Extremity - Block Type:  SCIATIC nerve block, lateral approach    Laterality:  Left  Procedures: ultrasound guided  Image captured, interpreted and electronically stored.  Local Infiltration:  Lidocaine  Strength:  1 %  Dose:  3 ml  Block Type:  Single-shot  Needle Length:  100mm  Needle Gauge:  21 G  Needle Localization:  Ultrasound guidance  Injection Assessment:  Negative aspiration for heme, no paresthesia on injection, incremental injection and local visualized surrounding nerve on ultrasound  Evidence of intravascular injection: No

## 2022-05-17 NOTE — ANESTHESIA TIME REPORT
Anesthesia Start and Stop Event Times     Date Time Event    5/16/2022 1655 Ready for Procedure     1724 Anesthesia Start     1840 Anesthesia Stop        Responsible Staff  05/16/22    Name Role Begin End    Osbaldo Ramos M.D. Anesth 1724 1840        Overtime Reason:  no overtime (within assigned shift)    Comments: 1st call weekday

## 2022-05-17 NOTE — OR NURSING
2000: Handoff received from [facility].     2015: Patient discharged home, all personal belongings and paperwork including crutches, personal belongings bag (clothes, cell phone), and AVS with patient upon leaving unit. Patient transported to [facility] via wheelchair.

## 2022-05-17 NOTE — OR NURSING
Patient recovered well in post-op. AAOx4. VSS, on RA. Surgical sites VIVI, surgical dressing in place CDI. Patient declines surgical pain. Patient able to intake fluids without nausea. Friend updated and discussed POC.     Discharge orders placed by MD. Patient to discharge home, by car, with friend. Discussed discharge instructions, prescriptions/medications, diet, activity, and follow up appointments. Patient verbalized understanding. Discharge paperwork signed. IV access removed. All personal belongings accounted for and sent with patient including crutches and cell phone. Patient escorted off unit.

## 2025-04-01 ENCOUNTER — OFFICE VISIT (OUTPATIENT)
Dept: URGENT CARE | Facility: PHYSICIAN GROUP | Age: 30
End: 2025-04-01
Payer: OTHER MISCELLANEOUS

## 2025-04-01 VITALS
HEART RATE: 67 BPM | HEIGHT: 70 IN | BODY MASS INDEX: 23.51 KG/M2 | RESPIRATION RATE: 20 BRPM | WEIGHT: 164.2 LBS | SYSTOLIC BLOOD PRESSURE: 120 MMHG | TEMPERATURE: 97.7 F | DIASTOLIC BLOOD PRESSURE: 68 MMHG | OXYGEN SATURATION: 97 %

## 2025-04-01 DIAGNOSIS — L42 PITYRIASIS ROSEA: ICD-10-CM

## 2025-04-01 PROCEDURE — 99203 OFFICE O/P NEW LOW 30 MIN: CPT

## 2025-04-01 RX ORDER — ACYCLOVIR 400 MG/1
400 TABLET ORAL 3 TIMES DAILY
Qty: 21 TABLET | Refills: 0 | Status: SHIPPED | OUTPATIENT
Start: 2025-04-01 | End: 2025-04-08

## 2025-04-01 RX ORDER — PREDNISONE 10 MG/1
40 TABLET ORAL DAILY
Qty: 20 TABLET | Refills: 0 | Status: SHIPPED | OUTPATIENT
Start: 2025-04-01 | End: 2025-04-06

## 2025-04-01 NOTE — PROGRESS NOTES
Subjective:   Meng Molina is a 30 y.o. male who presents for Other (Red marks all over,x1 month)    Patient presents to the clinic for complaints of red marks x 1 month  Patient started developing red lesions on his thighs that slowly spread up the front and back of his trunk over the next few weeks.  Notes red marks are at a stable number and no longer spreading, but have not been improving since.  Red marks are slightly raised, mildly scaly appearing, and nonpainful, nontender, and none itchy.  Denies noticing any mother or herald patch that preceded the spreading of the red marks.  Patient has not ingested any new foods nor has he had any new personal products, detergents, linens, or clothing.  Has taken no meds for his current symptoms.  Patient denies chest pain, SOB, dizziness/lightheadedness/vertigo, nausea/vomiting/diarrhea, difficulty breathing or swallowing, palpitations or racing heart rate, headaches, fever, chills, body aches, spots in his mouth, numbness and tingling, breaks in the skin or wounds, bleeding, drainage, or abscess/cyst formation.      Other        ROS  Refer to hospitals for additional details.    During this visit, appropriate PPE was worn, and hand hygiene was performed.    PMH:  has no past medical history on file.    MEDS:   Current Outpatient Medications:     acyclovir (ZOVIRAX) 400 MG tablet, Take 1 Tablet by mouth 3 times a day for 7 days., Disp: 21 Tablet, Rfl: 0    predniSONE (DELTASONE) 10 MG Tab, Take 4 Tablets by mouth every day for 5 days., Disp: 20 Tablet, Rfl: 0    ibuprofen (MOTRIN) 200 MG Tab, Take 400 mg by mouth as needed in the morning and 400 mg as needed at noon and 400 mg as needed in the evening and 400 mg as needed before bedtime for Mild Pain. (Patient not taking: Reported on 4/1/2025), Disp: , Rfl:     Naproxen Sod-diphenhydrAMINE (ALEVE PM PO), Take 2 Tablets by mouth at bedtime as needed. (Patient not taking: Reported on 4/1/2025), Disp: , Rfl:     multivitamin  "(THERAGRAN) Tab, Take 1 Tablet by mouth every day. (Patient not taking: Reported on 4/1/2025), Disp: , Rfl:     ALLERGIES: No Known Allergies  SURGHX:   Past Surgical History:   Procedure Laterality Date    PB OPEN TX DISTAL FIBULAR FRACTURE LAT MALLEOLUS Left 5/16/2022    Procedure: LEFT LATERAL MALLEOLUS OPEN REDUCTION INTERNAL FIXATION, SYNDESMOSIS OPEN REDUCTION INTERNAL FIXATION,;  Surgeon: Yakov Andrade M.D.;  Location: SURGERY [facility];  Service: Orthopedics     SOCHX:  reports that he has never smoked. He has never used smokeless tobacco. He reports current alcohol use. He reports current drug use. Drug: Inhaled.    FH: Per HPI as applicable/pertinent.    Medications, Allergies, and current problem list reviewed today in Epic.     Objective:     /68   Pulse 67   Temp 36.5 °C (97.7 °F) (Temporal)   Resp 20   Ht 1.778 m (5' 10\")   Wt 74.5 kg (164 lb 3.2 oz)   SpO2 97%     Physical Exam  Constitutional:       Appearance: Normal appearance. He is not ill-appearing or toxic-appearing.   HENT:      Head: Normocephalic.      Right Ear: Tympanic membrane, ear canal and external ear normal.      Left Ear: Tympanic membrane, ear canal and external ear normal.      Nose: Nose normal. No congestion or rhinorrhea.      Mouth/Throat:      Mouth: Mucous membranes are moist.      Pharynx: Oropharynx is clear. No oropharyngeal exudate or posterior oropharyngeal erythema.   Eyes:      General:         Right eye: No discharge.         Left eye: No discharge.      Extraocular Movements: Extraocular movements intact.      Conjunctiva/sclera: Conjunctivae normal.      Pupils: Pupils are equal, round, and reactive to light.   Cardiovascular:      Rate and Rhythm: Normal rate and regular rhythm.      Pulses: Normal pulses.      Heart sounds: Normal heart sounds. No murmur heard.  Pulmonary:      Effort: Pulmonary effort is normal. No respiratory distress.      Breath sounds: Normal breath sounds. No wheezing " or rhonchi.   Abdominal:      General: Abdomen is flat.   Musculoskeletal:         General: No signs of injury. Normal range of motion.      Cervical back: Normal range of motion. No rigidity.   Lymphadenopathy:      Cervical: No cervical adenopathy.   Skin:     General: Skin is warm and dry.      Coloration: Skin is not jaundiced or pale.      Findings: Erythema and lesion present. No bruising or rash.      Comments: Diffuse papulosquamous lesions that are mildly raised and erythematous spread throughout the patient's bilateral thighs and anterior and posterior aspects of his trunk.  No identifiable mother/herald patch.   Neurological:      General: No focal deficit present.      Mental Status: He is alert and oriented to person, place, and time.      Motor: No weakness.         Assessment/Plan:     Diagnosis and associated orders:     1. Pityriasis rosea  - acyclovir (ZOVIRAX) 400 MG tablet; Take 1 Tablet by mouth 3 times a day for 7 days.  Dispense: 21 Tablet; Refill: 0  - predniSONE (DELTASONE) 10 MG Tab; Take 4 Tablets by mouth every day for 5 days.  Dispense: 20 Tablet; Refill: 0  - Referral to Dermatology     Comments/MDM:     Discussed that likely etiology of the patient's symptoms is pityriasis rosea.  Given the duration of the patient's marks without resolution/improvement, reasonable to start antiviral treatment as this has shown to have efficacy against pityriasis rosea per up-to-date.  Acyclovir 3 times daily for 7 days prescribed the patient as well as prednisone 5-day course. Discussed proper administration and dosing as well as associated adverse/side effects of prescribed medications.  Advised patient to continue OTC pharmacologic and nonpharmacologic treatment of her symptoms, including but not limited to Tylenol, Motrin, skin hygiene and protection, as well as plenty of rest and fluids.  Referral to dermatology placed.  Instructed patient to make appointment with dermatology if symptoms persist  after current treatment plan or if symptoms worsen.  Patient agreeable to this plan of care.  All questions answered.  Return to clinic if symptoms persist or worsen.  Red flag symptoms warranting emergency medical services discussed, including but not limited to chest pain, SOB, wheezing, difficulty breathing or swallowing, sensation of throat closing or mass in the throat, severe intractable headache, changes to vision or hearing, palpitations or racing heart rate, abdominal pain, fever greater than 103F despite medication management, dizziness/lightheadedness/vertigo, or nausea/vomiting/diarrhea.           Differential diagnosis, natural history, supportive care, and indications for immediate follow-up discussed.    Advised the patient to follow-up with the primary care physician for recheck, reevaluation, and consideration of further management.    Instructed patient to seek emergency medical attention via calling EMS or going to the Emergency Room for red flag symptoms, including but not limited to: chest pain, palpitations, fever greater than 103F, shortness of breath, wheezing, new or worsened numbness/tingling, focal or unilateral weakness, and bloody vomit/stool.     Please note that this dictation was created using voice recognition software. I have made a reasonable attempt to correct obvious errors, but I expect that there are errors of grammar and possibly content that I did not discover before finalizing the note.    This note was electronically signed by OLINDA Camacho

## 2025-04-08 NOTE — Clinical Note
REFERRAL APPROVAL NOTICE         Sent on April 7, 2025                   Meng Molina  [address]  [place] [address]                   Dear Mr. Molina,    After a careful review of the medical information and benefit coverage, [facility] has processed your referral. See below for additional details.    If applicable, you must be actively enrolled with your insurance for coverage of the authorized service. If you have any questions regarding your coverage, please contact your insurance directly.    REFERRAL INFORMATION   Referral #:  [id number]  Referred-To Department    Referred-By Provider:  Dermatology    OLINDA Camacho   [facility]      [address]  [address]  [place] [address]  [phone] [address] [facility] [address]  [employer] [place] [id number] [phone]    Referral Start Date:  04/01/2025  Referral End Date:   04/01/2026             SCHEDULING  If you do not already have an appointment, please call [phone] to make an appointment.     MORE INFORMATION  If you do not already have a [employer] account, sign up at: [email or web address]  You can access your medical information, make appointments, see lab results, billing information, and more.  If you have questions regarding this referral, please contact  the [facility] Referrals department at:             [phone]. Monday - Friday 8:00AM - 5:00PM.     Sincerely,    [facility] [place] [facility]

## (undated) DEVICE — TUBING CLEARLINK DUO-VENT - C-FLO (48EA/CA)

## (undated) DEVICE — SLEEVE, VASO, THIGH, MED

## (undated) DEVICE — PROTECTOR ULNA NERVE - (36PR/CA)

## (undated) DEVICE — ELECTRODE 850 FOAM ADHESIVE - HYDROGEL RADIOTRNSPRNT (50/PK)

## (undated) DEVICE — BIT DRILL DIA2.6MM SCALED FOR VARIAX 2 WRIST FUSION LOCKING PLATE SYSTEM

## (undated) DEVICE — BLADE SURGICAL #15 - (50/BX 3BX/CA)

## (undated) DEVICE — CHLORAPREP 26 ML APPLICATOR - ORANGE TINT(25/CA)

## (undated) DEVICE — KIT ANESTHESIA W/CIRCUIT & 3/LT BAG W/FILTER (20EA/CA)

## (undated) DEVICE — GLOVE BIOGEL PI INDICATOR SZ 8.0 SURGICAL PF LF -(50/BX 4BX/CA)

## (undated) DEVICE — SUCTION INSTRUMENT YANKAUER BULBOUS TIP W/O VENT (50EA/CA)

## (undated) DEVICE — NEPTUNE 4 PORT MANIFOLD - (20/PK)

## (undated) DEVICE — SUTURE

## (undated) DEVICE — SUTURE 2-0 VICRYL PLUS CT-2 - 27 INCH (36/BX)

## (undated) DEVICE — GLOVE BIOGEL SZ 8 SURGICAL PF LTX - (50PR/BX 4BX/CA)

## (undated) DEVICE — MASK ANESTHESIA ADULT  - (100/CA)

## (undated) DEVICE — GOWN WARMING STANDARD FLEX - (30/CA)

## (undated) DEVICE — DRAPE 36X28IN RAD CARM BND BG - (25/CA) O

## (undated) DEVICE — TOWEL STOP TIMEOUT SAFETY FLAG (40EA/CA)

## (undated) DEVICE — DRESSING ABDOMINAL PAD STERILE 8 X 10" (360EA/CA)"

## (undated) DEVICE — ELECTRODE DUAL RETURN W/ CORD - (50/PK)

## (undated) DEVICE — SUTURE 3-0 VICRYL PLUS SH - 27 INCH (36/BX)

## (undated) DEVICE — BIT DRILL L122MM OD3.5MM NONSTERILE OVER ADD ON FITTING

## (undated) DEVICE — SYRINGE 30 ML LL (56/BX)

## (undated) DEVICE — TUBE CONNECTING SUCTION - CLEAR PLASTIC STERILE 72 IN (50EA/CA)

## (undated) DEVICE — PACK LOWER EXTREMITY - (2/CA)

## (undated) DEVICE — DRAPE LARGE 3 QUARTER - (20/CA)

## (undated) DEVICE — SUTURE GENERAL

## (undated) DEVICE — SODIUM CHL IRRIGATION 0.9% 1000ML (12EA/CA)

## (undated) DEVICE — HEAD HOLDER JUNIOR/ADULT

## (undated) DEVICE — SPLINT PLASTER 5 IN X 30 IN - (50EA/BX 6BX/CA)

## (undated) DEVICE — PAD LAP STERILE 18 X 18 - (5/PK 40PK/CA)

## (undated) DEVICE — GLOVE BIOGEL ECLIPSE PF LATEX SIZE 8.0  (50PR/BX)

## (undated) DEVICE — STOCKINET BIAS 6 IN STERILE - (20/CA)

## (undated) DEVICE — GOWN SURGEONS X-LARGE - DISP. (30/CA)

## (undated) DEVICE — SET EXTENSION WITH 2 PORTS (48EA/CA) ***PART #2C8610 IS A SUBSTITUTE*****

## (undated) DEVICE — PADDING CAST 6 IN STERILE - 6 X 4 YDS (24/CA)

## (undated) DEVICE — DRESSING 3X8 ADAPTIC GAUZE - NON-ADHERING (36/PK 6PK/BX)

## (undated) DEVICE — CANISTER SUCTION 3000ML MECHANICAL FILTER AUTO SHUTOFF MEDI-VAC NONSTERILE LF DISP  (40EA/CA)

## (undated) DEVICE — NEEDLE SAFETY 18 GA X 1 1/2 IN (100EA/BX)

## (undated) DEVICE — SUTURE 3-0 ETHILON FS-1 - (36/BX) 30 INCH

## (undated) DEVICE — SET LEADWIRE 5 LEAD BEDSIDE DISPOSABLE ECG (1SET OF 5/EA)